# Patient Record
Sex: MALE | Race: WHITE | URBAN - METROPOLITAN AREA
[De-identification: names, ages, dates, MRNs, and addresses within clinical notes are randomized per-mention and may not be internally consistent; named-entity substitution may affect disease eponyms.]

---

## 2020-03-18 ENCOUNTER — OFFICE VISIT (OUTPATIENT)
Dept: PRIMARY CARE CLINIC | Age: 25
End: 2020-03-18
Payer: COMMERCIAL

## 2020-03-18 VITALS
OXYGEN SATURATION: 98 % | HEIGHT: 77 IN | DIASTOLIC BLOOD PRESSURE: 82 MMHG | BODY MASS INDEX: 20.07 KG/M2 | HEART RATE: 99 BPM | SYSTOLIC BLOOD PRESSURE: 128 MMHG | RESPIRATION RATE: 20 BRPM | WEIGHT: 170 LBS | TEMPERATURE: 98.5 F

## 2020-03-18 LAB
INFLUENZA A ANTIBODY: NEGATIVE
INFLUENZA B ANTIBODY: NEGATIVE
S PYO AG THROAT QL: NORMAL

## 2020-03-18 PROCEDURE — 87804 INFLUENZA ASSAY W/OPTIC: CPT | Performed by: PHYSICIAN ASSISTANT

## 2020-03-18 PROCEDURE — 87880 STREP A ASSAY W/OPTIC: CPT | Performed by: PHYSICIAN ASSISTANT

## 2020-03-18 PROCEDURE — 99203 OFFICE O/P NEW LOW 30 MIN: CPT | Performed by: PHYSICIAN ASSISTANT

## 2020-03-18 RX ORDER — METHYLPREDNISOLONE 4 MG/1
TABLET ORAL
Qty: 1 KIT | Refills: 0 | Status: SHIPPED | OUTPATIENT
Start: 2020-03-18

## 2020-03-18 RX ORDER — CEFDINIR 300 MG/1
300 CAPSULE ORAL 2 TIMES DAILY
Qty: 20 CAPSULE | Refills: 0 | Status: SHIPPED | OUTPATIENT
Start: 2020-03-18 | End: 2020-03-28

## 2020-03-18 SDOH — HEALTH STABILITY: MENTAL HEALTH: HOW OFTEN DO YOU HAVE A DRINK CONTAINING ALCOHOL?: MONTHLY OR LESS

## 2020-03-18 NOTE — PROGRESS NOTES
Renee Henriquez  1995    Chief Complaint   Patient presents with    Pharyngitis     onset 3-4 days    Other     fatigue    Other     body aches       Respiratory Symptoms:  Patient complains of several day(s) history of sore throat. fatigue,aody aches. Symptoms have been improving with time. He denies any other symptoms. The patient has had a sore throat, some generalized fatigue, body aches for the last couple of days. Patient states that he is mostly having a sore throat. He feels like this is strep throat that he has had in the past.  Patient is not having any chest pain, shortness of breath, abdominal pain. The patient recently quit smoking. The patient has not traveled out of the country. The patient denies any other complaints at this time. Relevant PMH: No pertinent PMH. History reviewed. No pertinent past medical history. History reviewed. No pertinent surgical history. Social History     Tobacco History     Smoking Status  Former Smoker    Smokeless Tobacco Use  Never Used          Alcohol History     Alcohol Use Status  Yes          Drug Use     Drug Use Status  Never          Sexual Activity     Sexually Active  Not Asked                  Smoking history:  He  reports that he has quit smoking. He has never used smokeless tobacco.     He has had no known ill contacts. Treatment to date: dayquil. .    Travel screen completed:  Yes          Vitals:    03/18/20 1022   BP: 128/82   Pulse: 99   Resp: 20   Temp: 98.5 °F (36.9 °C)   SpO2: 98%   Weight: 170 lb (77.1 kg)   Height: 6' 5\" (1.956 m)      Physical Exam   Nurse's notes and vital signs reviewed. The patient is not hypoxic. ? General: Alert, no acute distress, patient resting comfortably Patient is not toxic or lethargic. Skin: Warm, intact, no pallor noted. There is no evidence of rash at this time.   Head: Normocephalic, atraumatic  Eye: Normal conjunctiva  Ears, Nose, Throat: Right tympanic membrane clear, left tympanic

## 2020-03-27 ENCOUNTER — TELEPHONE (OUTPATIENT)
Dept: PRIMARY CARE CLINIC | Age: 25
End: 2020-03-27

## 2020-03-27 NOTE — TELEPHONE ENCOUNTER
Call to pt and VM left requesting call back to update us on symptoms resolving. Advised if any questions/symptoms issues to contact [employer] access # [phone] with any needs.

## 2022-12-13 ENCOUNTER — OFFICE VISIT (OUTPATIENT)
Dept: FAMILY MEDICINE CLINIC | Age: 27
End: 2022-12-13

## 2022-12-13 VITALS
HEART RATE: 100 BPM | HEIGHT: 77 IN | SYSTOLIC BLOOD PRESSURE: 144 MMHG | DIASTOLIC BLOOD PRESSURE: 94 MMHG | TEMPERATURE: 97.1 F | OXYGEN SATURATION: 98 % | WEIGHT: 170 LBS | BODY MASS INDEX: 20.07 KG/M2 | RESPIRATION RATE: 18 BRPM

## 2022-12-13 DIAGNOSIS — H66.91 RIGHT OTITIS MEDIA, UNSPECIFIED OTITIS MEDIA TYPE: Primary | ICD-10-CM

## 2022-12-13 PROCEDURE — 99212 OFFICE O/P EST SF 10 MIN: CPT | Performed by: NURSE PRACTITIONER

## 2022-12-13 RX ORDER — AMOXICILLIN 875 MG/1
875 TABLET, COATED ORAL 2 TIMES DAILY
Qty: 20 TABLET | Refills: 0 | Status: SHIPPED | OUTPATIENT
Start: 2022-12-13 | End: 2022-12-23

## 2022-12-13 NOTE — PROGRESS NOTES
Subjective:  Chief Complaint   Patient presents with    Otalgia       HPI: Pt presents with right-2sided ear pain for the past 2 days. The patient denies any trauma or injury to the ear. Reports pink drainage. Patient denies fever, chills, cough, runny nose or nasal congestion. Denies any rashes, tinnitus, dizziness, vomiting, diarrhea, abdominal pain, HA and or lethargy. The patient is otherwise healthy . ROS:  Positive and pertinent negatives as per HPI. All other systems are reviewed and negative. Current Outpatient Medications:     amoxicillin (AMOXIL) 875 MG tablet, Take 1 tablet by mouth 2 times daily for 10 days, Disp: 20 tablet, Rfl: 0    methylPREDNISolone (MEDROL DOSEPACK) 4 MG tablet, Take by mouth. (Patient not taking: Reported on 12/13/2022), Disp: 1 kit, Rfl: 0   No Known Allergies     Objective:  Vitals:    12/13/22 1101 12/13/22 1104   BP: (!) 144/94 (!) 144/94   Pulse: 100    Resp: 18    Temp: 97.1 °F (36.2 °C)    TempSrc: Temporal    SpO2: 98%    Weight: 170 lb (77.1 kg)    Height: 6' 5\" (1.956 m)         Exam:  Const: Appears healthy and well developed. Vital reviewed as per triage. No acute distress. Head/Face: Normocephalic, atraumatic. Facies is symmetric. Eyes: Pupils equal, round and reactive to light. ENMT: Right external canal is without inflammation or occlusion although there is a small amount of bloody discharge. Left external canal is without inflammation or occlusion. No pain with movement of auricles. Right tympanic membrane is intact, erythematous, with loss of light reflex. Left tympanic membrane is pearly gray with good light reflex. No mastoid tenderness noted bilaterally. Nares are patent. Buccal mucosa is moist.  No erythema in the posterior pharynx. Resp: Clear to auscultation bilaterally. CV: Rhythm is regular. S1 is normal. S2 is normal.  Lymph: No visible or palpable cervical lymphadenopathy. Submandibular nodes not palpable. No meningeal signs.   Skin: Skin is warm and dry. Neuro: Alert and oriented x3. Speech is articulate and fluent. Psych: Mood and affect are appropriate to situation. Interacting in room with examiner and parent as would be expected for age. I am able to visualize the entire tympanic membrane and I do not see a perforation. However, because of the blood in the external auditory canal, I have asked the patient to avoid water, and to return in 7 days for recheck. He is to come in sooner if he has further drainage. Discussed with patient the use of probiotics to assist with adverse GI effects including C-diff. Shantell Loaiza was seen today for otalgia. Diagnoses and all orders for this visit:    Right otitis media, unspecified otitis media type  -     amoxicillin (AMOXIL) 875 MG tablet;  Take 1 tablet by mouth 2 times daily for 10 days        Seen By:    DARA Yun - CNP